# Patient Record
Sex: FEMALE | Race: WHITE
[De-identification: names, ages, dates, MRNs, and addresses within clinical notes are randomized per-mention and may not be internally consistent; named-entity substitution may affect disease eponyms.]

---

## 2019-12-06 ENCOUNTER — RX ONLY (OUTPATIENT)
Age: 59
Setting detail: RX ONLY
End: 2019-12-06

## 2020-03-20 ENCOUNTER — RX ONLY (OUTPATIENT)
Age: 60
Setting detail: RX ONLY
End: 2020-03-20

## 2020-04-07 ENCOUNTER — RX ONLY (OUTPATIENT)
Age: 60
Setting detail: RX ONLY
End: 2020-04-07

## 2024-08-13 ENCOUNTER — APPOINTMENT (RX ONLY)
Dept: URBAN - METROPOLITAN AREA CLINIC 352 | Facility: CLINIC | Age: 64
Setting detail: DERMATOLOGY
End: 2024-08-13

## 2024-08-13 ENCOUNTER — RX ONLY (OUTPATIENT)
Age: 64
Setting detail: RX ONLY
End: 2024-08-13

## 2024-08-13 DIAGNOSIS — L98.8 OTHER SPECIFIED DISORDERS OF THE SKIN AND SUBCUTANEOUS TISSUE: ICD-10-CM

## 2024-08-13 PROCEDURE — ? BOTOX

## 2024-08-13 PROCEDURE — ? ADDITIONAL NOTES

## 2024-08-13 RX ORDER — VALACYCLOVIR 1 G/1
TABLET, FILM COATED ORAL
Qty: 60 | Refills: 1 | Status: ERX | COMMUNITY
Start: 2024-08-13

## 2024-08-13 NOTE — PROCEDURE: BOTOX
Additional Area 2 Units: 0
Show Masseter Units: Yes
Show Right And Left Brow Units: No
Lot #: [id number]
Expiration Date (Month Year): 6/2026
Dilution (U/0.1 Cc): 5
Glabellar Complex Units: 26
Price (Use Numbers Only, No Special Characters Or $): 560.00
Detail Level: Detailed
Consent: Written consent obtained. Risks include but not limited to lid/brow ptosis, bruising, swelling, diplopia, temporary effect, incomplete chemical denervation.
Periorbital Skin Units: 10
Post-Care Instructions: Patient instructed to not lie down for 4 hours and limit physical activity for 24 hours. Patient instructed not to travel by airplane for 48 hours.
Forehead Units: 4

## 2024-08-13 NOTE — PROCEDURE: ADDITIONAL NOTES
Detail Level: Detailed
Render Risk Assessment In Note?: yes
Additional Notes: Sampled patient PROMISEB and VTama during today’s visit for a rash.

## 2024-09-05 ENCOUNTER — RX ONLY (OUTPATIENT)
Age: 64
Setting detail: RX ONLY
End: 2024-09-05

## 2024-09-05 RX ORDER — VALACYCLOVIR 1 G/1
TABLET, FILM COATED ORAL
Qty: 180 | Refills: 1 | Status: ERX

## 2025-04-17 ENCOUNTER — APPOINTMENT (OUTPATIENT)
Dept: URBAN - METROPOLITAN AREA CLINIC 327 | Facility: CLINIC | Age: 65
Setting detail: DERMATOLOGY
End: 2025-04-17

## 2025-04-17 DIAGNOSIS — L98.8 OTHER SPECIFIED DISORDERS OF THE SKIN AND SUBCUTANEOUS TISSUE: ICD-10-CM

## 2025-04-17 PROCEDURE — ? BOTOX

## 2025-04-17 NOTE — PROCEDURE: BOTOX
Dilution (U/0.1 Cc): 5
Depressor Anguli Oris Units: 0
Show Masseter Units: Yes
Show Right And Left Periorbital Units: No
Forehead Units: 4
Glabellar Complex Units: 26
Detail Level: Detailed
Expiration Date (Month Year): 12/2026
Price (Use Numbers Only, No Special Characters Or $): 930
Consent: Written consent obtained. Risks include but not limited to lid/brow ptosis, bruising, swelling, diplopia, temporary effect, incomplete chemical denervation.
Periorbital Skin Units: 10
Post-Care Instructions: Patient instructed to not lie down for 4 hours and limit physical activity for 24 hours. Patient instructed not to travel by airplane for 48 hours.
Incrementing Botox Units: By 0.5 Units
Lot #: [id number]
Additional Area 1 Location: perioral/lip flip